# Patient Record
Sex: FEMALE | Race: WHITE | NOT HISPANIC OR LATINO | Employment: FULL TIME | URBAN - METROPOLITAN AREA
[De-identification: names, ages, dates, MRNs, and addresses within clinical notes are randomized per-mention and may not be internally consistent; named-entity substitution may affect disease eponyms.]

---

## 2017-03-16 ENCOUNTER — HOSPITAL ENCOUNTER (OUTPATIENT)
Dept: LAB | Facility: MEDICAL CENTER | Age: 37
End: 2017-03-16
Attending: OBSTETRICS & GYNECOLOGY
Payer: COMMERCIAL

## 2017-03-16 PROCEDURE — 88175 CYTOPATH C/V AUTO FLUID REDO: CPT

## 2017-03-16 PROCEDURE — 87624 HPV HI-RISK TYP POOLED RSLT: CPT

## 2017-03-17 LAB
CYTOLOGY REG CYTOL: NORMAL
HPV HR 12 DNA CVX QL NAA+PROBE: NEGATIVE
HPV16 DNA SPEC QL NAA+PROBE: NEGATIVE
HPV18 DNA SPEC QL NAA+PROBE: NEGATIVE
SPECIMEN SOURCE: NORMAL

## 2017-11-10 ENCOUNTER — OFFICE VISIT (OUTPATIENT)
Dept: URGENT CARE | Facility: PHYSICIAN GROUP | Age: 37
End: 2017-11-10
Payer: COMMERCIAL

## 2017-11-10 VITALS
OXYGEN SATURATION: 99 % | SYSTOLIC BLOOD PRESSURE: 100 MMHG | HEART RATE: 97 BPM | TEMPERATURE: 100.3 F | DIASTOLIC BLOOD PRESSURE: 70 MMHG | RESPIRATION RATE: 13 BRPM | HEIGHT: 62 IN | WEIGHT: 103 LBS | BODY MASS INDEX: 18.95 KG/M2

## 2017-11-10 DIAGNOSIS — J22 LRTI (LOWER RESPIRATORY TRACT INFECTION): ICD-10-CM

## 2017-11-10 DIAGNOSIS — J01.00 ACUTE MAXILLARY SINUSITIS, RECURRENCE NOT SPECIFIED: ICD-10-CM

## 2017-11-10 PROCEDURE — 99214 OFFICE O/P EST MOD 30 MIN: CPT | Performed by: PHYSICIAN ASSISTANT

## 2017-11-10 RX ORDER — BENZONATATE 200 MG/1
200 CAPSULE ORAL 3 TIMES DAILY PRN
Qty: 60 CAP | Refills: 0 | Status: SHIPPED | OUTPATIENT
Start: 2017-11-10 | End: 2019-11-07

## 2017-11-10 RX ORDER — CODEINE PHOSPHATE AND GUAIFENESIN 10; 100 MG/5ML; MG/5ML
5 SOLUTION ORAL EVERY 6 HOURS PRN
Qty: 120 ML | Refills: 0 | Status: SHIPPED | OUTPATIENT
Start: 2017-11-10 | End: 2019-11-07 | Stop reason: SDUPTHER

## 2017-11-10 RX ORDER — DOXYCYCLINE HYCLATE 100 MG
100 TABLET ORAL 2 TIMES DAILY
Qty: 14 TAB | Refills: 0 | Status: SHIPPED | OUTPATIENT
Start: 2017-11-10 | End: 2017-11-17

## 2017-11-10 ASSESSMENT — ENCOUNTER SYMPTOMS
ABDOMINAL PAIN: 0
DIZZINESS: 0
TINGLING: 0
WHEEZING: 0
SORE THROAT: 1
SINUS PRESSURE: 1
MYALGIAS: 0
SWOLLEN GLANDS: 0
SPUTUM PRODUCTION: 1
FOCAL WEAKNESS: 0
DIARRHEA: 0
COUGH: 1
HEADACHES: 1
VOMITING: 0
CHILLS: 0
SHORTNESS OF BREATH: 0
PALPITATIONS: 0
SENSORY CHANGE: 0
FEVER: 0
NAUSEA: 0

## 2017-11-10 NOTE — PROGRESS NOTES
Subjective:      Keith Goff is a 36 y.o. female who presents with Sinus Problem (C/o productive cough, worse at night, chest congestion, heavy chest, nasal congestion, sinus pain/pressure, post nasal drip, pos fever x1 wk.)            Sinus Problem   This is a new problem. Episode onset: 1 week  The problem is unchanged. Maximum temperature: subjective  The pain is mild. Associated symptoms include congestion, coughing (productive with purulent sputum), headaches, sinus pressure and a sore throat. Pertinent negatives include no chills, ear pain, shortness of breath or swollen glands. Treatments tried: Tylenol Cold and Flu, Theraflu, Advil, Delsym  The treatment provided no relief.       Past Medical History:   Diagnosis Date   • Allergy    • Anxiety    • GERD (gastroesophageal reflux disease)    • Headache(784.0)      History reviewed. No pertinent surgical history.  Family History   Problem Relation Age of Onset   • Cancer Mother      Breast   • Lung Disease Mother      mets from breast   • Heart Disease Father    • Hypertension Father    • Hyperlipidemia Father    • Stroke Paternal Aunt    • Stroke Paternal Uncle    • Cancer Maternal Grandfather      Throat   • Heart Disease Maternal Grandfather      Bee Sting reaction         No Known Allergies    Medications, Allergies, and current problem list reviewed today in Epic    Review of Systems   Constitutional: Negative for chills, fever and malaise/fatigue.   HENT: Positive for congestion, sinus pressure and sore throat. Negative for ear discharge and ear pain.         Sinus problem   Respiratory: Positive for cough (productive with purulent sputum) and sputum production. Negative for shortness of breath and wheezing.    Cardiovascular: Negative for chest pain, palpitations and leg swelling.   Gastrointestinal: Negative for abdominal pain, diarrhea, nausea and vomiting.   Musculoskeletal: Negative for myalgias.   Neurological: Positive for headaches. Negative  "for dizziness, tingling, sensory change and focal weakness.     All other systems reviewed and are negative.        Objective:     /70   Pulse 97   Temp 37.9 °C (100.3 °F)   Resp 13   Ht 1.575 m (5' 2\")   Wt 46.7 kg (103 lb)   SpO2 99%   BMI 18.84 kg/m²      Physical Exam   Constitutional: She is oriented to person, place, and time. She appears well-developed and well-nourished. No distress.   HENT:   Head: Normocephalic and atraumatic.   Right Ear: Tympanic membrane, external ear and ear canal normal.   Left Ear: Tympanic membrane, external ear and ear canal normal.   Nose: Mucosal edema and rhinorrhea present. Right sinus exhibits maxillary sinus tenderness and frontal sinus tenderness. Left sinus exhibits maxillary sinus tenderness and frontal sinus tenderness.   Mouth/Throat: Uvula is midline, oropharynx is clear and moist and mucous membranes are normal.   Eyes: Conjunctivae are normal.   Neck: Neck supple.   Cardiovascular: Normal rate, regular rhythm and normal heart sounds.  Exam reveals no gallop and no friction rub.    No murmur heard.  Pulmonary/Chest: Effort normal. No respiratory distress. She has no decreased breath sounds. She has wheezes (mild wheezes in upper lung fields bilaterally). She has no rhonchi. She has no rales.   Lymphadenopathy:     She has no cervical adenopathy.   Neurological: She is alert and oriented to person, place, and time. No cranial nerve deficit.   Skin: Skin is warm and dry. No rash noted.   Psychiatric: She has a normal mood and affect. Her behavior is normal. Judgment and thought content normal.               Assessment/Plan:     1. Acute maxillary sinusitis, recurrence not specified  doxycycline (VIBRAMYCIN) 100 MG Tab    guaifenesin-codeine (CHERATUSSIN AC) Solution oral solution    benzonatate (TESSALON) 200 MG capsule   2. LRTI (lower respiratory tract infection)  doxycycline (VIBRAMYCIN) 100 MG Tab    guaifenesin-codeine (CHERATUSSIN AC) Solution oral " solution       .    Current Outpatient Prescriptions:     •  doxycycline (VIBRAMYCIN) 100 MG Tab, Take 1 Tab by mouth 2 times a day for 7 days., Disp: 14 Tab, Rfl: 0  •  guaifenesin-codeine (CHERATUSSIN AC) Solution oral solution, Take 5 mL by mouth every 6 hours as needed., Disp: 120 mL, Rfl: 0  Sedation side effects discussed. No Driving or alcohol with medication given.  •  benzonatate (TESSALON) 200 MG capsule, Take 1 Cap by mouth 3 times a day as needed for Cough., Disp: 60 Cap, Rfl: 0       Differential diagnoses, Supportive care, and indications for immediate follow-up discussed with patient.   Instructed to return to clinic or nearest emergency department for any change in condition, further concerns, or worsening of symptoms.    The patient demonstrated a good understanding and agreed with the treatment plan.    Milly Munoz P.A.-C.

## 2017-12-12 ENCOUNTER — OFFICE VISIT (OUTPATIENT)
Dept: MEDICAL GROUP | Facility: PHYSICIAN GROUP | Age: 37
End: 2017-12-12
Payer: COMMERCIAL

## 2017-12-12 VITALS
DIASTOLIC BLOOD PRESSURE: 78 MMHG | BODY MASS INDEX: 19.39 KG/M2 | HEART RATE: 116 BPM | WEIGHT: 106 LBS | TEMPERATURE: 99 F | OXYGEN SATURATION: 97 % | SYSTOLIC BLOOD PRESSURE: 118 MMHG

## 2017-12-12 DIAGNOSIS — J22 LRTI (LOWER RESPIRATORY TRACT INFECTION): ICD-10-CM

## 2017-12-12 DIAGNOSIS — J01.00 ACUTE MAXILLARY SINUSITIS, RECURRENCE NOT SPECIFIED: ICD-10-CM

## 2017-12-12 PROCEDURE — 99214 OFFICE O/P EST MOD 30 MIN: CPT | Performed by: NURSE PRACTITIONER

## 2017-12-12 RX ORDER — CODEINE PHOSPHATE AND GUAIFENESIN 10; 100 MG/5ML; MG/5ML
10 SOLUTION ORAL EVERY 6 HOURS PRN
Qty: 180 ML | Refills: 0 | Status: SHIPPED
Start: 2017-12-12 | End: 2019-11-07

## 2017-12-12 RX ORDER — AMOXICILLIN AND CLAVULANATE POTASSIUM 875; 125 MG/1; MG/1
1 TABLET, FILM COATED ORAL 2 TIMES DAILY
Qty: 14 TAB | Refills: 0 | Status: SHIPPED | OUTPATIENT
Start: 2017-12-12 | End: 2017-12-19

## 2017-12-12 NOTE — PROGRESS NOTES
Chief Complaint   Patient presents with   • Sinusitis     cough, sinus and chest congestion,  x 8 days     37 y.o. female established patient presents today for evaluation and management of:   HPI: 8 days of illness including: fever, chills, nasal congestion, rhinorrhea, facial pain, cough,  Mucus is: thick, yellow  Symptoms negative for hemoptysis, vomiting, diarrhea, rash   Similarly ill exposures: yes  Medical history positive for recent sinusitis, LURI   visit last month, Treated with doxycycline which caused a significant amount of nausea but she did complete treatment. Symptoms improved for a couple of weeks and then returned.   She  reports that she has never smoked. She has never used smokeless tobacco.    ROS   No skin rashes.  No N/V/D  No Known Allergies   Current Outpatient Prescriptions   Medication Sig Dispense Refill   • Uthxsgdrn-EXF-LD-APAP (TYLENOL COLD CONVENIENCE PACK PO) Take  by mouth.     • amoxicillin-clavulanate (AUGMENTIN) 875-125 MG Tab Take 1 Tab by mouth 2 times a day for 7 days. 14 Tab 0   • guaifenesin-codeine (ROBITUSSIN AC) Solution oral solution Take 10 mL by mouth every 6 hours as needed for Cough. Drink plenty of water. Do not drink alcohol, drive, or operate machinery while taking. 180 mL 0   • benzonatate (TESSALON) 200 MG capsule Take 1 Cap by mouth 3 times a day as needed for Cough. 60 Cap 0   • fluticasone (FLONASE) 50 MCG/ACT nasal spray Spray 1 Spray in nose 2 times a day. 16 g 1   • guaifenesin-codeine (CHERATUSSIN AC) Solution oral solution Take 5 mL by mouth every 6 hours as needed. 120 mL 0     No current facility-administered medications for this visit.        Blood pressure 118/78, pulse (!) 116, temperature 37.2 °C (99 °F), weight 48.1 kg (106 lb), SpO2 97 %.  Gen: alert, No conversational dyspnea. No audible wheeze, nontoxic.  PERRL, conjunctiva slightly injected. No photophobia. No eye discharge.  Ears: normal pinnae. TM: bulging  Nares non-patent with thin  mucus.  Sinuses tender over maxillary / frontal sinuses  Throat: mild erythematous injection. No exudate.   Neck: supple, with  no adenopathy  Lungs:  clear to auscultation, no wheezing, no retraction, no stridor, good air exchange.   Abdomen soft. No HSM.   Skin: warm and dry. No rash.    A/P    1. Acute maxillary sinusitis, recurrence not specified  amoxicillin-clavulanate (AUGMENTIN) 875-125 MG Tab   2. LRTI (lower respiratory tract infection)  amoxicillin-clavulanate (AUGMENTIN) 875-125 MG Tab    guaifenesin-codeine (ROBITUSSIN AC) Solution oral solution      Differential diagnosis, natural history, treatment options, supportive care, and indications for immediate follow-up discussed at length.     Treatments advised today in addition to orders above  include: Nasal decongestant, sinus rinse or nasal saline, OTC cough/cold product of patient's choice PRN, prescription for symptomatic treatment as written and fluids and rest  Followup for worsening symptoms, difficulty breathing, lack of expected recovery, or should new symptoms or problems arise.

## 2018-03-20 ENCOUNTER — HOSPITAL ENCOUNTER (OUTPATIENT)
Dept: LAB | Facility: MEDICAL CENTER | Age: 38
End: 2018-03-20
Attending: OBSTETRICS & GYNECOLOGY
Payer: COMMERCIAL

## 2018-03-20 LAB — CYTOLOGY REG CYTOL: NORMAL

## 2018-03-20 PROCEDURE — 88175 CYTOPATH C/V AUTO FLUID REDO: CPT

## 2019-04-22 ENCOUNTER — HOSPITAL ENCOUNTER (OUTPATIENT)
Dept: LAB | Facility: MEDICAL CENTER | Age: 39
End: 2019-04-22
Attending: OBSTETRICS & GYNECOLOGY
Payer: COMMERCIAL

## 2019-04-22 PROCEDURE — 88175 CYTOPATH C/V AUTO FLUID REDO: CPT

## 2019-04-24 LAB — CYTOLOGY REG CYTOL: NORMAL

## 2019-05-15 ENCOUNTER — TELEPHONE (OUTPATIENT)
Dept: SCHEDULING | Facility: IMAGING CENTER | Age: 39
End: 2019-05-15

## 2019-05-16 ENCOUNTER — OFFICE VISIT (OUTPATIENT)
Dept: MEDICAL GROUP | Facility: PHYSICIAN GROUP | Age: 39
End: 2019-05-16
Payer: COMMERCIAL

## 2019-05-16 VITALS
HEIGHT: 62 IN | OXYGEN SATURATION: 98 % | WEIGHT: 116.2 LBS | DIASTOLIC BLOOD PRESSURE: 70 MMHG | HEART RATE: 84 BPM | BODY MASS INDEX: 21.38 KG/M2 | RESPIRATION RATE: 16 BRPM | TEMPERATURE: 99.1 F | SYSTOLIC BLOOD PRESSURE: 106 MMHG

## 2019-05-16 DIAGNOSIS — J01.90 SUBACUTE RHINOSINUSITIS: ICD-10-CM

## 2019-05-16 DIAGNOSIS — Z00.00 ANNUAL PHYSICAL EXAM: ICD-10-CM

## 2019-05-16 PROCEDURE — 99214 OFFICE O/P EST MOD 30 MIN: CPT | Performed by: NURSE PRACTITIONER

## 2019-05-16 RX ORDER — BENZONATATE 100 MG/1
100 CAPSULE ORAL 3 TIMES DAILY PRN
Qty: 60 CAP | Refills: 0 | Status: SHIPPED | OUTPATIENT
Start: 2019-05-16 | End: 2019-11-07

## 2019-05-16 RX ORDER — LORATADINE 10 MG/1
10 TABLET ORAL DAILY
COMMUNITY
End: 2022-12-29

## 2019-05-16 RX ORDER — AMOXICILLIN AND CLAVULANATE POTASSIUM 875; 125 MG/1; MG/1
1 TABLET, FILM COATED ORAL 2 TIMES DAILY
Qty: 14 TAB | Refills: 0 | Status: SHIPPED | OUTPATIENT
Start: 2019-05-16 | End: 2019-05-23

## 2019-05-16 RX ORDER — ACETAMINOPHEN 325 MG/1
650 TABLET ORAL EVERY 4 HOURS PRN
COMMUNITY
End: 2019-11-07

## 2019-05-16 ASSESSMENT — PATIENT HEALTH QUESTIONNAIRE - PHQ9: CLINICAL INTERPRETATION OF PHQ2 SCORE: 0

## 2019-05-16 NOTE — PROGRESS NOTES
Chief Complaint   Patient presents with   • Establish Care     sick x 1 week       HISTORY OF PRESENT ILLNESS: Patient is a 38 y.o. female, established patient who presents today to discuss medical problems as listed below:    Health Maintenance:  COMPLETED    Subacute rhinosinusitis  This is a new problem. Pt states she started coughing last week. She also has frontal sinus pressure, nasal discharge, post nasal drip, sore throat, headaches, mild ear pressure. She feels like her cold is settling down in her chest. She tried OTC medicines for cold which did not help. She is getting worse, and her cough now is productive. She admits to sick contacts at work and home (2 sons  age). Pertinent negatives: no chills, no CP, SOB, no wheezing, no NVD.      Patient Active Problem List    Diagnosis Date Noted   • Annual physical exam 05/16/2019   • Subacute rhinosinusitis 05/16/2019        Allergies: Doxycycline    Current Outpatient Prescriptions   Medication Sig Dispense Refill   • loratadine (CLARITIN) 10 MG Tab Take 10 mg by mouth every day.     • acetaminophen (TYLENOL) 325 MG Tab Take 650 mg by mouth every four hours as needed.     • amoxicillin-clavulanate (AUGMENTIN) 875-125 MG Tab Take 1 Tab by mouth 2 times a day for 7 days. 14 Tab 0   • benzonatate (TESSALON) 100 MG Cap Take 1 Cap by mouth 3 times a day as needed for Cough. 60 Cap 0   • Rwklcnflf-KJH-DU-APAP (TYLENOL COLD CONVENIENCE PACK PO) Take  by mouth.     • guaifenesin-codeine (ROBITUSSIN AC) Solution oral solution Take 10 mL by mouth every 6 hours as needed for Cough. Drink plenty of water. Do not drink alcohol, drive, or operate machinery while taking. (Patient not taking: Reported on 5/16/2019) 180 mL 0   • guaifenesin-codeine (CHERATUSSIN AC) Solution oral solution Take 5 mL by mouth every 6 hours as needed. (Patient not taking: Reported on 5/16/2019) 120 mL 0   • benzonatate (TESSALON) 200 MG capsule Take 1 Cap by mouth 3 times a day as needed  "for Cough. (Patient not taking: Reported on 5/16/2019) 60 Cap 0   • fluticasone (FLONASE) 50 MCG/ACT nasal spray Spray 1 Spray in nose 2 times a day. (Patient not taking: Reported on 5/16/2019) 16 g 1     No current facility-administered medications for this visit.        Social History   Substance Use Topics   • Smoking status: Never Smoker   • Smokeless tobacco: Never Used   • Alcohol use 0.5 oz/week     1 Cans of beer per week      Comment: 1/wk     Social History     Social History Narrative   • No narrative on file       Family History   Problem Relation Age of Onset   • Cancer Mother         Breast   • Lung Disease Mother         mets from breast   • Heart Disease Father    • Hypertension Father    • Hyperlipidemia Father    • Stroke Paternal Aunt    • Stroke Paternal Uncle    • Cancer Maternal Grandfather         Throat   • Heart Disease Maternal Grandfather         Bee Sting reaction       Allergies, past medical history, past surgical history, family history, social history reviewed and updated.    Review of Systems:      - Constitutional: positive for fever last night (100.4), no chills, unexpected weight change, and fatigue/generalized weakness.     - HEENT: positive for mild headaches, b/l ear pressure, rhinorrhea, sinus congestion, sore throat    - Respiratory: positive for cough, sputum production and  chest congestion, no dyspnea, wheezing, and crackles.      - Cardiovascular: Negative for chest pain, palpitations, orthopnea, and bilateral lower extremity edema.     - Psychiatric/Behavioral: Negative for depression, suicidal/homicidal ideation and memory loss.      All other systems reviewed and are negative    Exam:    /70 (BP Location: Right arm, Patient Position: Sitting, BP Cuff Size: Adult)   Pulse 84   Temp 37.3 °C (99.1 °F) (Temporal)   Resp 16   Ht 1.575 m (5' 2\")   Wt 52.7 kg (116 lb 3.2 oz)   LMP 05/11/2019 (Exact Date)   SpO2 98%   BMI 21.25 kg/m²  Body mass index is 21.25 " kg/m².    Physical Exam:  Constitutional: Well-developed and well-nourished. Not diaphoretic. No distress.   Skin: Skin is warm and dry. No rash noted.  Eyes: Conjunctivae and extraocular motions are normal. Pupils are equal, round, and reactive to light. No scleral icterus.   Ears:  External ears unremarkable. Tympanic membranes clear and intact.  Nose: Nares patent. Septum midline. Turbinates with mild erythema, no edema.   Mouth/Throat: Dentition is normal. Tongue normal. Oropharynx is clear and moist. Posterior pharynx with erythema, no exudates.  Neck: Supple, trachea midline. Normal range of motion. No thyromegaly present. No lymphadenopathy--cervical or supraclavicular.  Cardiovascular: Regular rate and rhythm, S1 and S2 without murmur, rubs, or gallops.    Chest: Effort normal. Clear to auscultation throughout. No adventitious sounds. No CVA tenderness.  Abdomen: Soft, non tender, and without distention. Active bowel sounds in all four quadrants. No rebound, guarding, masses or HSM.  : Negative for dysuria, polyuria, hematuria, pyuria, urinary urgency, and urinary incontinence.  Extremities: No cyanosis, clubbing, erythema, nor edema. Distal pulses intact and symmetric.   Musculoskeletal: All major joints AROM full in all directions without pain.  Neurological: Alert and oriented x 3. DTRs 2+/3 and symmetric.   Psychiatric:  Behavior, mood, and affect are appropriate.    Assessment/Plan:  1. Annual physical exam  - CBC WITH DIFFERENTIAL; Future  - Comp Metabolic Panel; Future  - FREE THYROXINE; Future  - HEMOGLOBIN A1C; Future  - Lipid Profile; Future  - TSH; Future  - VITAMIN D,25 HYDROXY; Future    2. Subacute rhinosinusitis:  Uncontrolled. Discussed prescribed regimen and rational. Educated on supportive care, rest, hydration with warm fluids. OTC vit C, Zinc and oregano oil supplements. Avoid refined carbohydrates while on antibiotics, add more fruits and veggies to diet.  - amoxicillin-clavulanate  (AUGMENTIN) 875-125 MG Tab; Take 1 Tab by mouth 2 times a day for 7 days.  Dispense: 14 Tab; Refill: 0  - benzonatate (TESSALON) 100 MG Cap; Take 1 Cap by mouth 3 times a day as needed for Cough.  Dispense: 60 Cap; Refill: 0    Discussed with patient possible alternative diagnoses, pt is to take all medications as prescribed. If symptoms persist FU w/PCP, if symptoms worsen go to emergency room.  Reviewed indication, dosage, usage and potential adverse effects of prescribed medications. Reviewed risks and benefits of treatment plan. Patient verbalizes understanding of all instruction and verbally agrees to plan.    Return if symptoms worsen or fail to improve.

## 2019-05-16 NOTE — ASSESSMENT & PLAN NOTE
This is a new problem. Pt states she started coughing last week. She also has frontal sinus pressure, nasal discharge, post nasal drip, sore throat, headaches, mild ear pressure. She feels like her cold is settling down in her chest. She tried OTC medicines for cold which did not help. She is getting worse, and her cough now is productive. She admits to sick contacts at work and home (2 sons  age). Pertinent negatives: no chills, no CP, SOB, no wheezing, no NVD.

## 2019-11-07 ENCOUNTER — OFFICE VISIT (OUTPATIENT)
Dept: MEDICAL GROUP | Facility: PHYSICIAN GROUP | Age: 39
End: 2019-11-07
Payer: COMMERCIAL

## 2019-11-07 VITALS
TEMPERATURE: 98.3 F | DIASTOLIC BLOOD PRESSURE: 68 MMHG | HEART RATE: 96 BPM | WEIGHT: 115.8 LBS | HEIGHT: 62 IN | SYSTOLIC BLOOD PRESSURE: 120 MMHG | BODY MASS INDEX: 21.31 KG/M2 | OXYGEN SATURATION: 96 %

## 2019-11-07 DIAGNOSIS — J22 LRTI (LOWER RESPIRATORY TRACT INFECTION): ICD-10-CM

## 2019-11-07 DIAGNOSIS — J01.00 ACUTE MAXILLARY SINUSITIS, RECURRENCE NOT SPECIFIED: ICD-10-CM

## 2019-11-07 DIAGNOSIS — J32.9 RHINOSINUSITIS: ICD-10-CM

## 2019-11-07 PROCEDURE — 99213 OFFICE O/P EST LOW 20 MIN: CPT | Performed by: NURSE PRACTITIONER

## 2019-11-07 RX ORDER — FLUTICASONE PROPIONATE 50 MCG
1 SPRAY, SUSPENSION (ML) NASAL
Qty: 1 BOTTLE | Refills: 1 | Status: SHIPPED | OUTPATIENT
Start: 2019-11-07 | End: 2024-03-19

## 2019-11-07 RX ORDER — AMOXICILLIN AND CLAVULANATE POTASSIUM 875; 125 MG/1; MG/1
1 TABLET, FILM COATED ORAL 2 TIMES DAILY
Qty: 14 TAB | Refills: 0 | Status: SHIPPED | OUTPATIENT
Start: 2019-11-07 | End: 2019-11-14

## 2019-11-07 RX ORDER — BENZONATATE 100 MG/1
100 CAPSULE ORAL 3 TIMES DAILY PRN
Qty: 60 CAP | Refills: 0 | Status: SHIPPED | OUTPATIENT
Start: 2019-11-07 | End: 2022-05-05

## 2019-11-07 RX ORDER — CODEINE PHOSPHATE AND GUAIFENESIN 10; 100 MG/5ML; MG/5ML
5 SOLUTION ORAL EVERY 6 HOURS PRN
Qty: 120 ML | Refills: 0 | Status: SHIPPED | OUTPATIENT
Start: 2019-11-07 | End: 2019-11-17

## 2019-11-07 NOTE — PROGRESS NOTES
Chief Complaint   Patient presents with   • Cough     cough and sore throat x2 weeks but getting better       HISTORY OF PRESENT ILLNESS: Patient is a 38 y.o. female, established patient who presents today to discuss medical problems as listed below:    Health Maintenance:  COMPLETED.    Rhinosinusitis  New problem.  2 weeks ago patient started having sore throat followed by cough which became productive in a few days.  Sore throat resolved.  Today patient is complaining of runny nose with colored discharge, PND, productive cough with colored sputum.  She denies sinus congestion, ear pain, headaches, dizziness, NVD, fevers (low-grade fevers in the first week while being sick).      Patient Active Problem List    Diagnosis Date Noted   • Rhinosinusitis 11/07/2019   • Annual physical exam 05/16/2019   • Subacute rhinosinusitis 05/16/2019        Allergies: Doxycycline    Current Outpatient Medications   Medication Sig Dispense Refill   • benzonatate (TESSALON) 100 MG Cap Take 1 Cap by mouth 3 times a day as needed for Cough. 60 Cap 0   • amoxicillin-clavulanate (AUGMENTIN) 875-125 MG Tab Take 1 Tab by mouth 2 times a day for 7 days. 14 Tab 0   • fluticasone (FLONASE) 50 MCG/ACT nasal spray Spray 1 Spray in nose every bedtime. 1 Bottle 1   • guaifenesin-codeine (CHERATUSSIN AC) Solution oral solution Take 5 mL by mouth every 6 hours as needed for up to 10 days. 120 mL 0   • loratadine (CLARITIN) 10 MG Tab Take 10 mg by mouth every day.     • acetaminophen (TYLENOL) 325 MG Tab Take 650 mg by mouth every four hours as needed.     • benzonatate (TESSALON) 100 MG Cap Take 1 Cap by mouth 3 times a day as needed for Cough. (Patient not taking: Reported on 11/7/2019) 60 Cap 0   • Qywzmolux-APO-MS-APAP (TYLENOL COLD CONVENIENCE PACK PO) Take  by mouth.     • guaifenesin-codeine (ROBITUSSIN AC) Solution oral solution Take 10 mL by mouth every 6 hours as needed for Cough. Drink plenty of water. Do not drink alcohol, drive, or  operate machinery while taking. (Patient not taking: Reported on 5/16/2019) 180 mL 0   • benzonatate (TESSALON) 200 MG capsule Take 1 Cap by mouth 3 times a day as needed for Cough. (Patient not taking: Reported on 5/16/2019) 60 Cap 0   • fluticasone (FLONASE) 50 MCG/ACT nasal spray Spray 1 Spray in nose 2 times a day. (Patient not taking: Reported on 5/16/2019) 16 g 1     No current facility-administered medications for this visit.        Social History     Tobacco Use   • Smoking status: Never Smoker   • Smokeless tobacco: Never Used   Substance Use Topics   • Alcohol use: Yes     Alcohol/week: 0.5 oz     Types: 1 Cans of beer per week     Comment: 1/wk   • Drug use: No     Social History     Patient does not qualify to have social determinant information on file (likely too young).   Social History Narrative   • Not on file       Family History   Problem Relation Age of Onset   • Cancer Mother         Breast   • Lung Disease Mother         mets from breast   • Heart Disease Father    • Hypertension Father    • Hyperlipidemia Father    • Stroke Paternal Aunt    • Stroke Paternal Uncle    • Cancer Maternal Grandfather         Throat   • Heart Disease Maternal Grandfather         Bee Sting reaction       Allergies, past medical history, past surgical history, family history, social history reviewed and updated.    Review of Systems:      - Constitutional: Negative for fever, chills, unexpected weight change, and fatigue/generalized weakness.     - HEENT: Positive for rhinorrhea, mild sore throat and PND.  Negative for headaches, vision changes, hearing changes, sinus congestion, and neck pain.      - Respiratory: Positive for productive cough with colored sputum, no chest congestion, dyspnea, wheezing, and crackles.      - Cardiovascular: Negative for chest pain, palpitations, orthopnea, and bilateral lower extremity edema.       All other systems reviewed and are negative    Exam:    /68 (BP Location: Left  "arm, Patient Position: Sitting, BP Cuff Size: Adult)   Pulse 96   Temp 36.8 °C (98.3 °F) (Temporal)   Ht 1.575 m (5' 2\")   Wt 52.5 kg (115 lb 12.8 oz)   SpO2 96%   BMI 21.18 kg/m²  Body mass index is 21.18 kg/m².    Physical Exam:  Constitutional: Well-developed and well-nourished. Not diaphoretic. No distress.   Ears: Tympanic membranes clear and intact.  Nose: Turbinates with erythema and mild edema. No discharge.   Mouth/Throat:  Posterior pharynx with mild erythema, no exudates.  Neck: No lymphadenopathy--cervical or supraclavicular.  Cardiovascular: Regular rate and rhythm, S1 and S2 without murmur, rubs, or gallops.    Chest: Effort normal. Clear to auscultation throughout. No adventitious sounds.     Assessment/Plan:  1. Rhinosinusitis  Take antibiotics as prescribed to avoid antibiotic resistance.  Discussed side effects of codeine and a cough syrup.  Avoid taking during the day and before driving, use Tessalon Perls during the day.  Discussed importance of supportive care such as increased hydration and rest, warm fluids preferred.  OTC vitamin C and zinc.  - guaifenesin-codeine (CHERATUSSIN AC) Solution oral solution; Take 5 mL by mouth every 6 hours as needed for up to 10 days.  Dispense: 120 mL; Refill: 0      Discussed with patient possible alternative diagnoses, pt is to take all medications as prescribed. If symptoms persist FU w/PCP, if symptoms worsen go to emergency room. If experiencing any side effects from prescribed medications reports to the office immediately or go to emergency room.  Reviewed indication, dosage, usage and potential adverse effects of prescribed medications. Reviewed risks and benefits of treatment plan. Patient verbalizes understanding of all instruction and verbally agrees to plan.    Return if symptoms worsen or fail to improve.  "

## 2019-11-07 NOTE — ASSESSMENT & PLAN NOTE
New problem.  2 weeks ago patient started having sore throat followed by cough which became productive in a few days.  Sore throat resolved.  Today patient is complaining of runny nose with colored discharge, PND, productive cough with colored sputum.  She denies sinus congestion, ear pain, headaches, dizziness, NVD, fevers (low-grade fevers in the first week while being sick).

## 2021-10-22 ENCOUNTER — HOSPITAL ENCOUNTER (OUTPATIENT)
Dept: LAB | Facility: MEDICAL CENTER | Age: 41
End: 2021-10-22
Attending: PHYSICIAN ASSISTANT
Payer: COMMERCIAL

## 2021-10-22 PROCEDURE — 88175 CYTOPATH C/V AUTO FLUID REDO: CPT

## 2021-10-23 LAB — CYTOLOGY REG CYTOL: NORMAL

## 2022-05-05 ENCOUNTER — OFFICE VISIT (OUTPATIENT)
Dept: MEDICAL GROUP | Facility: PHYSICIAN GROUP | Age: 42
End: 2022-05-05
Payer: COMMERCIAL

## 2022-05-05 VITALS
TEMPERATURE: 97.6 F | HEART RATE: 103 BPM | HEIGHT: 62 IN | BODY MASS INDEX: 21.49 KG/M2 | RESPIRATION RATE: 16 BRPM | SYSTOLIC BLOOD PRESSURE: 110 MMHG | OXYGEN SATURATION: 97 % | DIASTOLIC BLOOD PRESSURE: 70 MMHG | WEIGHT: 116.8 LBS

## 2022-05-05 DIAGNOSIS — R09.89 CHEST CONGESTION: ICD-10-CM

## 2022-05-05 DIAGNOSIS — R06.2 WHEEZING: ICD-10-CM

## 2022-05-05 DIAGNOSIS — Z12.31 ENCOUNTER FOR SCREENING MAMMOGRAM FOR BREAST CANCER: ICD-10-CM

## 2022-05-05 DIAGNOSIS — R68.89 FLU-LIKE SYMPTOMS: ICD-10-CM

## 2022-05-05 DIAGNOSIS — R05.9 COUGH: ICD-10-CM

## 2022-05-05 PROCEDURE — 99214 OFFICE O/P EST MOD 30 MIN: CPT | Performed by: NURSE PRACTITIONER

## 2022-05-05 RX ORDER — GUAIFENESIN 600 MG/1
600 TABLET, EXTENDED RELEASE ORAL EVERY 12 HOURS
Qty: 60 TABLET | Refills: 1 | Status: SHIPPED | OUTPATIENT
Start: 2022-05-05 | End: 2022-05-15

## 2022-05-05 RX ORDER — TRIAMCINOLONE ACETONIDE 55 UG/1
2 SPRAY, METERED NASAL DAILY
Qty: 16.5 G | Refills: 1 | Status: SHIPPED
Start: 2022-05-05 | End: 2022-12-29

## 2022-05-05 RX ORDER — BENZONATATE 100 MG/1
100 CAPSULE ORAL 3 TIMES DAILY PRN
Qty: 60 CAPSULE | Refills: 0 | Status: SHIPPED | OUTPATIENT
Start: 2022-05-05

## 2022-05-05 RX ORDER — ALBUTEROL SULFATE 90 UG/1
2 AEROSOL, METERED RESPIRATORY (INHALATION) EVERY 6 HOURS PRN
Qty: 8.5 G | Refills: 1 | Status: SHIPPED | OUTPATIENT
Start: 2022-05-05

## 2022-05-05 RX ORDER — AZITHROMYCIN 250 MG/1
TABLET, FILM COATED ORAL
Qty: 6 TABLET | Refills: 0 | Status: SHIPPED
Start: 2022-05-05 | End: 2022-12-29

## 2022-05-05 ASSESSMENT — PATIENT HEALTH QUESTIONNAIRE - PHQ9: CLINICAL INTERPRETATION OF PHQ2 SCORE: 0

## 2022-05-05 NOTE — PROGRESS NOTES
Chief Complaint   Patient presents with   • Follow-Up     Flu last week, congestion and cough       HISTORY OF PRESENT ILLNESS: Patient is a 41 y.o. female, established patient who presents today to discuss medical problems as listed below:    Health Maintenance:  COMPLETED     Flu-like symptoms  New onset of flulike symptoms.  Cough, sinus congestion, chest congestion.  Associated symptoms are nasal drainage, PND.  Mild dyspnea, no CP, no wheezing, no fever.  Sick contacts and kids, diagnosed with influenza A.  Denies GI symptoms, no nausea, diarrhea.  Patient is not interested in being tested for COVID and influenza today.  Vital signs within normal limits except slightly elevated pulse at 103, satting at 97% on room air.      Patient Active Problem List    Diagnosis Date Noted   • Cough 05/05/2022   • Flu-like symptoms 05/05/2022   • Rhinosinusitis 11/07/2019   • Annual physical exam 05/16/2019   • Subacute rhinosinusitis 05/16/2019        Allergies: Doxycycline    Current Outpatient Medications   Medication Sig Dispense Refill   • guaiFENesin ER (MUCINEX) 600 MG TABLET SR 12 HR Take 1 Tablet by mouth every 12 hours for 10 days. 60 Tablet 1   • benzonatate (TESSALON) 100 MG Cap Take 1 Capsule by mouth 3 times a day as needed for Cough. 60 Capsule 0   • azithromycin (ZITHROMAX) 250 MG Tab Take 2 tabs on day 1, then take 1 tab on days 2-5 6 Tablet 0   • triamcinolone (NASACORT) 55 MCG/ACT nasal inhaler Administer 2 Sprays into affected nostril(S) every day. 16.5 g 1   • albuterol 108 (90 Base) MCG/ACT Aero Soln inhalation aerosol Inhale 2 Puffs every 6 hours as needed for Shortness of Breath. 8.5 g 1   • fluticasone (FLONASE) 50 MCG/ACT nasal spray Spray 1 Spray in nose every bedtime. 1 Bottle 1   • loratadine (CLARITIN) 10 MG Tab Take 10 mg by mouth every day.       No current facility-administered medications for this visit.       Social History     Tobacco Use   • Smoking status: Never Smoker   • Smokeless  "tobacco: Never Used   Substance Use Topics   • Alcohol use: Yes     Alcohol/week: 0.5 oz     Types: 1 Cans of beer per week     Comment: 1/wk   • Drug use: No     Social History     Social History Narrative   • Not on file       Family History   Problem Relation Age of Onset   • Cancer Mother         Breast   • Lung Disease Mother         mets from breast   • Heart Disease Father    • Hypertension Father    • Hyperlipidemia Father    • Stroke Paternal Aunt    • Stroke Paternal Uncle    • Cancer Maternal Grandfather         Throat   • Heart Disease Maternal Grandfather         Bee Sting reaction       Allergies, past medical history, past surgical history, family history, social history reviewed and updated.    Review of Systems:     - Constitutional: Negative for fever, chills, unexpected weight change, and fatigue/generalized weakness.     - HEENT: Rhinorrhea, sinus congestion, chest congestion, rhinitis, cough.       - Respiratory: Cough, chest congestion, dyspnea, mild wheezing.      - Cardiovascular: Negative for chest pain, palpitations, orthopnea, and bilateral lower extremity edema.     - Psychiatric/Behavioral: Negative for depression, suicidal/homicidal ideation and memory loss.      All other systems reviewed and are negative    Exam:    /70   Pulse (!) 103   Temp 36.4 °C (97.6 °F) (Temporal)   Resp 16   Ht 1.575 m (5' 2\")   Wt 53 kg (116 lb 12.8 oz)   SpO2 97%   BMI 21.36 kg/m²  Body mass index is 21.36 kg/m².    Physical Exam:  Constitutional: Well-developed and well-nourished. Not diaphoretic. No distress.   Ears:  External ears unremarkable. Tympanic membranes clear and intact.  Nose:  Turbinates without erythema nor edema. No discharge.   Mouth/Throat:  Posterior pharynx with moderate erythema no exudates.  Neck:  No lymphadenopathy--cervical or supraclavicular.  Cardiovascular: Regular rate and rhythm, S1 and S2 without murmur, rubs, or gallops.    Chest: Noted congested cough with deep " breaths. effort normal. Clear to auscultation throughout. No adventitious sounds.   Neurological: Alert and oriented x 3.   Psychiatric:  Behavior, mood, and affect are appropriate.  MA/nursing note and vitals reviewed.     Assessment/Plan:  1. Cough  Persistent cough, not improving.  Concerns for risk of acute bacterial bronchitis.  Therapy as prescribed below.  Avoid dairy, warm fluids, vitamin C and zinc.  Supportive care.  We will follow-up 7 to 10 days..  - benzonatate (TESSALON) 100 MG Cap; Take 1 Capsule by mouth 3 times a day as needed for Cough.  Dispense: 60 Capsule; Refill: 0    2. Flu-like symptoms    3. Encounter for screening mammogram for breast cancer  - MA-SCREENING MAMMO BILAT W/TOMOSYNTHESIS W/CAD; Future    4. Chest congestion  - guaiFENesin ER (MUCINEX) 600 MG TABLET SR 12 HR; Take 1 Tablet by mouth every 12 hours for 10 days.  Dispense: 60 Tablet; Refill: 1  - azithromycin (ZITHROMAX) 250 MG Tab; Take 2 tabs on day 1, then take 1 tab on days 2-5  Dispense: 6 Tablet; Refill: 0    5. Wheezing  - triamcinolone (NASACORT) 55 MCG/ACT nasal inhaler; Administer 2 Sprays into affected nostril(S) every day.  Dispense: 16.5 g; Refill: 1  - albuterol 108 (90 Base) MCG/ACT Aero Soln inhalation aerosol; Inhale 2 Puffs every 6 hours as needed for Shortness of Breath.  Dispense: 8.5 g; Refill: 1       Discussed with patient possible alternative diagnoses, pt is to take all medications as prescribed. If symptoms persist FU w/PCP, if symptoms worsen go to emergency room. If experiencing any side effects from prescribed medications reports to the office immediately or go to emergency room.  Reviewed indication, dosage, usage and potential adverse effects of prescribed medications. Reviewed risks and benefits of treatment plan. Patient verbalizes understanding of all instruction and verbally agrees to plan.    No follow-ups on file. 7-10 days PRN

## 2022-05-05 NOTE — ASSESSMENT & PLAN NOTE
New onset of flulike symptoms.  Cough, sinus congestion, chest congestion.  Associated symptoms are nasal drainage, PND.  Mild dyspnea, no CP, no wheezing, no fever.  Sick contacts and kids, diagnosed with influenza A.  Denies GI symptoms, no nausea, diarrhea.  Patient is not interested in being tested for COVID and influenza today.  Vital signs within normal limits except slightly elevated pulse at 103, satting at 97% on room air.

## 2022-12-29 ENCOUNTER — OFFICE VISIT (OUTPATIENT)
Dept: MEDICAL GROUP | Facility: MEDICAL CENTER | Age: 42
End: 2022-12-29
Payer: COMMERCIAL

## 2022-12-29 VITALS
RESPIRATION RATE: 16 BRPM | HEIGHT: 62 IN | HEART RATE: 108 BPM | DIASTOLIC BLOOD PRESSURE: 66 MMHG | WEIGHT: 114.64 LBS | SYSTOLIC BLOOD PRESSURE: 108 MMHG | TEMPERATURE: 98.5 F | BODY MASS INDEX: 21.1 KG/M2 | OXYGEN SATURATION: 98 %

## 2022-12-29 DIAGNOSIS — R05.1 ACUTE COUGH: ICD-10-CM

## 2022-12-29 DIAGNOSIS — R05.2 SUBACUTE COUGH: ICD-10-CM

## 2022-12-29 DIAGNOSIS — J32.9 RHINOSINUSITIS: ICD-10-CM

## 2022-12-29 DIAGNOSIS — R09.81 SINUS CONGESTION: ICD-10-CM

## 2022-12-29 DIAGNOSIS — R09.89 CHEST CONGESTION: ICD-10-CM

## 2022-12-29 LAB
EXTERNAL QUALITY CONTROL: NORMAL
FLUAV+FLUBV AG SPEC QL IA: NEGATIVE
INT CON NEG: NEGATIVE
INT CON NEG: NEGATIVE
INT CON POS: POSITIVE
INT CON POS: POSITIVE
SARS-COV+SARS-COV-2 AG RESP QL IA.RAPID: NEGATIVE

## 2022-12-29 PROCEDURE — 99214 OFFICE O/P EST MOD 30 MIN: CPT | Performed by: NURSE PRACTITIONER

## 2022-12-29 PROCEDURE — 87804 INFLUENZA ASSAY W/OPTIC: CPT | Performed by: NURSE PRACTITIONER

## 2022-12-29 PROCEDURE — 87426 SARSCOV CORONAVIRUS AG IA: CPT | Performed by: NURSE PRACTITIONER

## 2022-12-29 RX ORDER — AZITHROMYCIN 250 MG/1
250 TABLET, FILM COATED ORAL
Qty: 14 TABLET | Refills: 0 | Status: SHIPPED | OUTPATIENT
Start: 2022-12-29 | End: 2023-01-05

## 2022-12-29 RX ORDER — BENZONATATE 100 MG/1
100 CAPSULE ORAL 3 TIMES DAILY PRN
Qty: 60 CAPSULE | Refills: 0 | Status: SHIPPED | OUTPATIENT
Start: 2022-12-29

## 2022-12-29 RX ORDER — TRIAMCINOLONE ACETONIDE 55 UG/1
2 SPRAY, METERED NASAL DAILY
Qty: 16.9 ML | Refills: 1 | Status: SHIPPED | OUTPATIENT
Start: 2022-12-29

## 2022-12-29 RX ORDER — AZITHROMYCIN 250 MG/1
TABLET, FILM COATED ORAL
Qty: 6 TABLET | Refills: 0 | Status: SHIPPED | OUTPATIENT
Start: 2022-12-29 | End: 2024-03-19

## 2022-12-29 RX ORDER — GUAIFENESIN 600 MG/1
600 TABLET, EXTENDED RELEASE ORAL EVERY 12 HOURS
Qty: 60 TABLET | Refills: 1 | Status: SHIPPED | OUTPATIENT
Start: 2022-12-29 | End: 2023-01-08

## 2022-12-29 RX ORDER — DEXTROMETHORPHAN HYDROBROMIDE AND GUAIFENESIN 10; 100 MG/5ML; MG/5ML
10 SOLUTION ORAL EVERY 4 HOURS PRN
Qty: 236 ML | Refills: 0 | Status: SHIPPED | OUTPATIENT
Start: 2022-12-29

## 2022-12-29 RX ORDER — BENZONATATE 100 MG/1
100 CAPSULE ORAL 3 TIMES DAILY PRN
Qty: 60 CAPSULE | Refills: 0 | Status: CANCELLED | OUTPATIENT
Start: 2022-12-29

## 2022-12-29 NOTE — ASSESSMENT & PLAN NOTE
New problem. Cough x 1 wks, a few days ago getting worse. Associated s/s, sinus congestion, rhinitis, PND. No fever, no ear pain. Sick contacts in family members.

## 2022-12-29 NOTE — PROGRESS NOTES
Chief Complaint   Patient presents with    Cough     Had flu in November, cough lingering and getting worse        HISTORY OF PRESENT ILLNESS: Patient is a 42 y.o. female, established patient who presents today to discuss medical problems as listed below:    Cough  New problem. Cough x 1 wks, a few days ago getting worse. Associated s/s, sinus congestion, rhinitis, PND. No fever, no ear pain. Sick contacts in family members.     Patient Active Problem List    Diagnosis Date Noted    Cough 05/05/2022    Flu-like symptoms 05/05/2022    Rhinosinusitis 11/07/2019    Annual physical exam 05/16/2019    Subacute rhinosinusitis 05/16/2019        Allergies: Doxycycline    Current Outpatient Medications   Medication Sig Dispense Refill    azithromycin (ZITHROMAX) 250 MG Tab Take 2 tabs on day 1, then take 1 tab on days 2-5 6 Tablet 0    guaiFENesin ER (MUCINEX) 600 MG TABLET SR 12 HR Take 1 Tablet by mouth every 12 hours for 10 days. 60 Tablet 1    triamcinolone (NASACORT) 55 MCG/ACT nasal inhaler Administer 2 Sprays into affected nostril(S) every day. 16.9 mL 1    guaifenesin dextromethorphan sugar free (ROBITUSSIN COLD COUGH+ CHEST)  MG/5ML Liquid soln Take 10 mL by mouth every four hours as needed for Cough. 236 mL 0    benzonatate (TESSALON) 100 MG Cap Take 1 Capsule by mouth 3 times a day as needed for Cough. 60 Capsule 0    albuterol 108 (90 Base) MCG/ACT Aero Soln inhalation aerosol Inhale 2 Puffs every 6 hours as needed for Shortness of Breath. 8.5 g 1    fluticasone (FLONASE) 50 MCG/ACT nasal spray Spray 1 Spray in nose every bedtime. 1 Bottle 1     No current facility-administered medications for this visit.       Social History     Tobacco Use    Smoking status: Never    Smokeless tobacco: Never   Substance Use Topics    Alcohol use: Yes     Alcohol/week: 0.5 oz     Types: 1 Cans of beer per week     Comment: 1/wk    Drug use: No     Social History     Social History Narrative    Not on file       Family  "History   Problem Relation Age of Onset    Cancer Mother         Breast    Lung Disease Mother         mets from breast    Heart Disease Father     Hypertension Father     Hyperlipidemia Father     Stroke Paternal Aunt     Stroke Paternal Uncle     Cancer Maternal Grandfather         Throat    Heart Disease Maternal Grandfather         Bee Sting reaction       Allergies, past medical history, past surgical history, family history, social history reviewed and updated.    Review of Systems:     - Constitutional: Negative for fever, chills, unexpected weight change, and fatigue/generalized weakness.     - HEENT: rhinorrhea, sinus congestion, b/ l ear tenderness. Negative for headaches, vision changes, hearing changes, ear pain, ear discharge, , sore throat, and neck pain.      - Respiratory: cough, chest congestion. Negative for sputum production, , dyspnea, wheezing, and crackles.      - Cardiovascular: Negative for chest pain, palpitations, orthopnea, and bilateral lower extremity edema.      - Psychiatric/Behavioral: Negative for depression, suicidal/homicidal ideation and memory loss.      All other systems reviewed and are negative    Exam:    /66 (BP Location: Left arm, Patient Position: Sitting, BP Cuff Size: Adult)   Pulse (!) 108   Temp 36.9 °C (98.5 °F) (Temporal)   Resp 16   Ht 1.575 m (5' 2\")   Wt 52 kg (114 lb 10.2 oz)   SpO2 98%   BMI 20.97 kg/m²  Body mass index is 20.97 kg/m².    Physical Exam:  Constitutional: Well-developed and well-nourished. Not diaphoretic. No distress.   Ears:  External ears unremarkable. Tympanic membranes clear and intact.  Nose: Nares patent. Septum midline. Turbinates without erythema nor edema. No discharge.   Mouth/Throat:  Posterior pharynx with moderate erythema no exudates.  Neck: No lymphadenopathy--cervical or supraclavicular.  Cardiovascular: Regular rate and rhythm, S1 and S2 without murmur, rubs, or gallops.    Chest: Effort normal. Clear to auscultation " throughout. No adventitious sounds.   Neurological: Alert and oriented x 3.   Psychiatric:  Behavior, mood, and affect are appropriate.  MA/nursing note and vitals reviewed.    Assessment/Plan:  1. Subacute cough  Rx, supportive care, warm fluids and rest.  - POCT Influenza A/B  - POCT SARS-COV Antigen [facility] (Symptomatic only)  - guaifenesin dextromethorphan sugar free (ROBITUSSIN COLD COUGH+ CHEST)  MG/5ML Liquid soln; Take 10 mL by mouth every four hours as needed for Cough.  Dispense: 236 mL; Refill: 0    2. Rhinosinusitis  - azithromycin (ZITHROMAX) 250 MG Tab; Take 2 tabs on day 1, then take 1 tab on days 2-5  Dispense: 6 Tablet; Refill: 0    3. Sinus congestion  - guaiFENesin ER (MUCINEX) 600 MG TABLET SR 12 HR; Take 1 Tablet by mouth every 12 hours for 10 days.  Dispense: 60 Tablet; Refill: 1  - triamcinolone (NASACORT) 55 MCG/ACT nasal inhaler; Administer 2 Sprays into affected nostril(S) every day.  Dispense: 16.9 mL; Refill: 1    4. Chest congestion  - guaiFENesin ER (MUCINEX) 600 MG TABLET SR 12 HR; Take 1 Tablet by mouth every 12 hours for 10 days.  Dispense: 60 Tablet; Refill: 1       Discussed with patient possible alternative diagnoses, patient is to take all medications as prescribed.      If symptoms persist FU w/PCP, if symptoms worsen go to emergency room.      If experiencing any side effects from prescribed medications report to the office immediately or go to emergency room.     Reviewed indication, dosage, usage and potential adverse effects of prescribed medications.      Reviewed risks and benefits of treatment plan. Patient verbalizes understanding of all instruction and verbally agrees to plan.     Discussed plan with the patient, and patient agrees to the above.      I personally reviewed prior external notes and test results pertinent to today's visit.    No follow-ups on file.

## 2022-12-30 RX ORDER — AZITHROMYCIN 250 MG/1
TABLET, FILM COATED ORAL
Qty: 6 TABLET | Refills: 0 | Status: SHIPPED | OUTPATIENT
Start: 2022-12-30

## 2022-12-31 ENCOUNTER — TELEPHONE (OUTPATIENT)
Dept: MEDICAL GROUP | Facility: MEDICAL CENTER | Age: 42
End: 2022-12-31
Payer: COMMERCIAL

## 2022-12-31 NOTE — TELEPHONE ENCOUNTER
On call provider    Received page from patient's pharmacy for order clarification. Patient was prescribed azithromycin 250 mg BID for 7 days, prescription changed to zpack yesterday. Approved changed to zpack.

## 2023-02-01 ENCOUNTER — HOSPITAL ENCOUNTER (OUTPATIENT)
Facility: MEDICAL CENTER | Age: 43
End: 2023-02-01
Attending: PHYSICIAN ASSISTANT
Payer: COMMERCIAL

## 2023-02-01 ENCOUNTER — HOSPITAL ENCOUNTER (OUTPATIENT)
Dept: LAB | Facility: MEDICAL CENTER | Age: 43
End: 2023-02-01
Attending: PHYSICIAN ASSISTANT

## 2023-02-01 PROCEDURE — 88175 CYTOPATH C/V AUTO FLUID REDO: CPT

## 2023-02-02 LAB — CYTOLOGY REG CYTOL: NORMAL

## 2024-03-19 ENCOUNTER — OFFICE VISIT (OUTPATIENT)
Dept: MEDICAL GROUP | Facility: MEDICAL CENTER | Age: 44
End: 2024-03-19
Payer: COMMERCIAL

## 2024-03-19 VITALS
OXYGEN SATURATION: 96 % | HEART RATE: 87 BPM | RESPIRATION RATE: 12 BRPM | DIASTOLIC BLOOD PRESSURE: 68 MMHG | HEIGHT: 62 IN | WEIGHT: 122.4 LBS | TEMPERATURE: 100.2 F | SYSTOLIC BLOOD PRESSURE: 100 MMHG | BODY MASS INDEX: 22.52 KG/M2

## 2024-03-19 DIAGNOSIS — R09.81 NASAL CONGESTION: ICD-10-CM

## 2024-03-19 DIAGNOSIS — J02.9 SORE THROAT: ICD-10-CM

## 2024-03-19 DIAGNOSIS — R05.9 COUGH, UNSPECIFIED TYPE: ICD-10-CM

## 2024-03-19 DIAGNOSIS — R09.89 CHEST CONGESTION: ICD-10-CM

## 2024-03-19 DIAGNOSIS — R50.9 LOW GRADE FEVER: ICD-10-CM

## 2024-03-19 DIAGNOSIS — J40 BRONCHITIS: ICD-10-CM

## 2024-03-19 PROCEDURE — 3078F DIAST BP <80 MM HG: CPT | Performed by: NURSE PRACTITIONER

## 2024-03-19 PROCEDURE — 3074F SYST BP LT 130 MM HG: CPT | Performed by: NURSE PRACTITIONER

## 2024-03-19 PROCEDURE — 99213 OFFICE O/P EST LOW 20 MIN: CPT | Performed by: NURSE PRACTITIONER

## 2024-03-19 RX ORDER — GUAIFENESIN 1200 MG/1
1200 TABLET, EXTENDED RELEASE ORAL
Qty: 28 TABLET | Refills: 0 | Status: SHIPPED
Start: 2024-03-19 | End: 2024-03-19

## 2024-03-19 RX ORDER — CODEINE PHOSPHATE AND GUAIFENESIN 10; 100 MG/5ML; MG/5ML
5 SOLUTION ORAL EVERY 6 HOURS PRN
Qty: 100 ML | Refills: 0 | Status: SHIPPED | OUTPATIENT
Start: 2024-03-19 | End: 2024-03-24

## 2024-03-19 RX ORDER — AZITHROMYCIN 250 MG/1
TABLET, FILM COATED ORAL
Qty: 6 TABLET | Refills: 0 | Status: SHIPPED | OUTPATIENT
Start: 2024-03-19

## 2024-03-19 RX ORDER — PSEUDOEPHEDRINE HCL 120 MG/1
120 TABLET, FILM COATED, EXTENDED RELEASE ORAL 2 TIMES DAILY PRN
Qty: 60 TABLET | Refills: 6 | Status: CANCELLED | OUTPATIENT
Start: 2024-03-19

## 2024-03-19 RX ORDER — FLUTICASONE PROPIONATE 50 MCG
1 SPRAY, SUSPENSION (ML) NASAL
Qty: 16 G | Refills: 1 | Status: SHIPPED | OUTPATIENT
Start: 2024-03-19

## 2024-03-19 RX ORDER — METHYLPREDNISOLONE 4 MG/1
TABLET ORAL
Qty: 21 TABLET | Refills: 0 | Status: SHIPPED | OUTPATIENT
Start: 2024-03-19

## 2024-03-19 RX ORDER — BENZONATATE 100 MG/1
100 CAPSULE ORAL 3 TIMES DAILY PRN
Qty: 60 CAPSULE | Refills: 0 | Status: CANCELLED | OUTPATIENT
Start: 2024-03-19

## 2024-03-19 RX ORDER — CODEINE PHOSPHATE AND GUAIFENESIN 10; 100 MG/5ML; MG/5ML
5 SOLUTION ORAL EVERY 6 HOURS PRN
Qty: 100 ML | Refills: 0 | Status: SHIPPED
Start: 2024-03-19 | End: 2024-03-19

## 2024-03-19 ASSESSMENT — PATIENT HEALTH QUESTIONNAIRE - PHQ9: CLINICAL INTERPRETATION OF PHQ2 SCORE: 0

## 2024-03-19 ASSESSMENT — ENCOUNTER SYMPTOMS
FEVER: 1
PALPITATIONS: 0
SORE THROAT: 1
CHILLS: 0

## 2024-03-19 NOTE — PROGRESS NOTES
Patient agreed to use of ZULY: yes  Chief Complaint   Patient presents with    Cough     2 WEEKS     Pharyngitis     4 DAYS        HISTORY OF PRESENT ILLNESS: Patient is a pleasant 43 y.o. female, established patient who presents today to discuss medical problems as listed below:    Assessment/Plan:    Razia was seen today for cough and pharyngitis.    Diagnoses and all orders for this visit:    Cough, unspecified type  -     Guaifenesin 1200 MG TABLET SR 12 HR; Take 1 Tablet by mouth 2 times a day.  -     guaifenesin-codeine (ROBITUSSIN AC) Solution oral solution; Take 5 mL by mouth every 6 hours as needed for Cough for up to 5 days.  -     methylPREDNISolone (MEDROL DOSEPAK) 4 MG Tablet Therapy Pack; As directed on the packaging label.    Low grade fever  -     azithromycin (ZITHROMAX) 250 MG Tab; Take 2 tabs on day 1, then take 1 tab on days 2-5    Sore throat    Chest congestion  -     Guaifenesin 1200 MG TABLET SR 12 HR; Take 1 Tablet by mouth 2 times a day.    Bronchitis  -     methylPREDNISolone (MEDROL DOSEPAK) 4 MG Tablet Therapy Pack; As directed on the packaging label.    Nasal congestion  -     fluticasone (FLONASE) 50 MCG/ACT nasal spray; Administer 1 Spray into affected nostril(S) at bedtime.              Assessment & Plan  1. Acute uncontrolled condition.  I think she has bronchitis. We are going to do Z-Bronson for the antibiotic, complete the course. She will avoid sugary drinks and foods. She will take probiotic after antibiotic course.  She is not interested in Tessalon Perles as it did not work for her in the past for cough.  She would like Fenesin with codeine.  Takes no other controlled substances.  She will take Flonase for the nasal drainage. She will take cough syrup with codeine for 5  days.. If she is not better in the next few days, she will come back.    History of Present Illness  This is a 43-year-old pleasant female who is here for URI concerns, sore throat and cough.    She is starting to  "become more nasally, a little bit of sinus congestion, and apparently has a low-grade fever of 100. The cough started about 2 weeks ago, the fever has been on and off for the last 4 or 5 days, and the nose symptoms have been going on for 3 or 4 days. She is not sure if the throat symptoms are from the cough or if it is something else. She denies any GI issues like nausea, ear pain, shortness of breath, wheezing, or chest pain from the coughing. She coughs up phlegm sometimes. Her children are sick at home, but none of them have tested positive for COVID-19. She has not done a home COVID-19 test. She has tried Tylenol Cold and Flu and Theraflu at nighttime, but nothing is helping. She feels like she is getting worse. The cough keeps her up all night. She denies any history of asthma. She denies any difficulty swallowing. She has taken azithromycin in the past and did well with it. She has mild nasal drainage.   The patient has no known allergies.  Pt declines swabs for strep throat, RSV, flu and COVID.    Health Maintenance:  COMPLETED       Allergies, past medical history, past surgical history, family history, social history reviewed and updated.    Review of Systems   Constitutional:  Positive for fever. Negative for chills and malaise/fatigue.        Low grade fever   HENT:  Positive for congestion and sore throat.    Cardiovascular:  Negative for chest pain, palpitations and leg swelling.          Exam:    /68 (BP Location: Left arm, Patient Position: Sitting, BP Cuff Size: Adult)   Pulse 87   Temp 37.9 °C (100.2 °F) (Temporal)   Resp 12   Ht 1.575 m (5' 2\")   Wt 55.5 kg (122 lb 6.4 oz)   SpO2 96%   BMI 22.39 kg/m²  Body mass index is 22.39 kg/m².      Physical Exam             Results             Discussed with patient possible alternative diagnoses, patient is to take all medications as prescribed.      If symptoms persist FU w/PCP, if symptoms worsen go to emergency room.      If experiencing any " side effects from prescribed medications report to the office immediately or go to emergency room.     Reviewed indication, dosage, usage and potential adverse effects of prescribed medications.      Reviewed risks and benefits of treatment plan. Patient verbalizes understanding of all instruction and verbally agrees to plan.     Discussed plan with the patient, and patient agrees to the above.      I personally reviewed prior external notes and test results pertinent to today's visit.

## 2024-03-28 ENCOUNTER — TELEPHONE (OUTPATIENT)
Dept: MEDICAL GROUP | Facility: MEDICAL CENTER | Age: 44
End: 2024-03-28

## 2024-03-28 ENCOUNTER — TELEMEDICINE (OUTPATIENT)
Dept: MEDICAL GROUP | Facility: MEDICAL CENTER | Age: 44
End: 2024-03-28
Payer: COMMERCIAL

## 2024-03-28 VITALS — WEIGHT: 122 LBS | HEIGHT: 62 IN | BODY MASS INDEX: 22.45 KG/M2

## 2024-03-28 DIAGNOSIS — R05.2 SUBACUTE COUGH: ICD-10-CM

## 2024-03-28 DIAGNOSIS — R06.2 WHEEZING: ICD-10-CM

## 2024-03-28 DIAGNOSIS — Z87.09 HISTORY OF ENLARGED TONSILS: ICD-10-CM

## 2024-03-28 RX ORDER — FLUTICASONE PROPIONATE AND SALMETEROL 250; 50 UG/1; UG/1
1 POWDER RESPIRATORY (INHALATION) EVERY 12 HOURS
Qty: 60 EACH | Refills: 1 | Status: SHIPPED | OUTPATIENT
Start: 2024-03-28

## 2024-03-28 ASSESSMENT — ENCOUNTER SYMPTOMS
WHEEZING: 1
PALPITATIONS: 0
COUGH: 1
CHILLS: 0
FEVER: 0

## 2024-03-28 NOTE — TELEPHONE ENCOUNTER
DOCUMENTATION OF PAR STATUS:    1. Name of Medication & Dose: Advair Diskus 250-50MCG/ACT aerosol powder     2. Name of Prescription Coverage Company & phone #: [email or web address]    3. Date Prior Auth Submitted: 3/28/24    4. What information was given to obtain insurance decision? Clinical office notes    5. Prior Auth Status? Pending    6. Patient Notified: N\A

## 2024-03-28 NOTE — PROGRESS NOTES
Virtual Visit: Established Patient   This visit was conducted via Zoom using secure and encrypted videoconferencing technology.   The patient was in their home in the [facility].    The patient's identity was confirmed and verbal consent was obtained for this virtual visit.   This evaluation was conducted via Zoom using secure and encrypted videoconferencing technology. The patient was in their home in the [facility].    The patient's identity was confirmed and verbal consent was obtained for this virtual visit.     Patient agreed to using ZULY: yes    Razia was seen today for cough and referral needed.    Diagnoses and all orders for this visit:    Subacute cough  -     PULMONARY FUNCTION TESTS -Test requested: Complete Pulmonary Function Test; Future  -     DX-CHEST-2 VIEWS; Future  -     Referral to ENT  -     fluticasone-salmeterol (ADVAIR) 250-50 MCG/ACT AEROSOL POWDER, BREATH ACTIVATED; Inhale 1 Puff every 12 hours.    Wheezing  -     PULMONARY FUNCTION TESTS -Test requested: Complete Pulmonary Function Test; Future  -     DX-CHEST-2 VIEWS; Future  -     Referral to ENT  -     fluticasone-salmeterol (ADVAIR) 250-50 MCG/ACT AEROSOL POWDER, BREATH ACTIVATED; Inhale 1 Puff every 12 hours.    History of enlarged tonsils  -     Referral to ENT              Assessment & Plan  1. Cough.  2. wheezing  Chronic and stable. Persistent cough improving. I will do a pulmonary function test and CXR. Trial of albuterol inhaler. She will use albuterol for 5 to 10 minutes before exercise as needed. F/u in 4 wks    3. Tonsil stones.  Chronic and stable problem. No exacerbations at this time.  I will refer her to ENT.    Follow-up  The patient will follow up in 4 to 6 weeks.    Subjective:   CC:   Chief Complaint   Patient presents with    Cough    Referral Needed     Tonsil removal        History of Present Illness  This is a pleasant 43-year-old female who is presenting via Zoom call. She is here to follow up on her  cough that she was diagnosed  on 03/19/2024. She was prescribed azithromycin, Medrol Dosepak, Flonase, and guaifenesin. She completed therapy and now she is experiencing wheezing.    Her cough has gotten better, but she is unsure if it is just the mucus has started to break up, which is causing more raspy cough. She is wondering if she needed another round of antibiotics. She is still waking up in the middle of the night coughing. She feels it has morphed into more of a looser bronchial cough rather than the dry cough that she was experiencing a couple of weeks ago. She has not been diagnosed with asthma. She was given some inhalers a long time ago, which seemed to clear up a lot. Albuterol inhaler helped. She has chest tightness and shortness of breath only if she is doing something active like walking up a hill.    She was recommended to have her tonsils removed a couple of years ago, but she never moved forward with that recommendation. She keeps getting tonsil stones. She would like a referral to ENT.       Review of Systems   Constitutional:  Negative for chills, fever and malaise/fatigue.   Respiratory:  Positive for cough and wheezing.    Cardiovascular:  Negative for chest pain, palpitations and leg swelling.        Current medicines (including changes today)  Current Outpatient Medications   Medication Sig Dispense Refill    fluticasone-salmeterol (ADVAIR) 250-50 MCG/ACT AEROSOL POWDER, BREATH ACTIVATED Inhale 1 Puff every 12 hours. 60 Each 1    fluticasone (FLONASE) 50 MCG/ACT nasal spray Administer 1 Spray into affected nostril(S) at bedtime. 16 g 1    azithromycin (ZITHROMAX) 250 MG Tab Take 2 tabs on day 1, then take 1 tab on days 2-5 6 Tablet 0    methylPREDNISolone (MEDROL DOSEPAK) 4 MG Tablet Therapy Pack As directed on the packaging label. 21 Tablet 0    azithromycin (ZITHROMAX) 250 MG Tab Take 2 tabs on day 1, then take 1 tab on days 2-5 6 Tablet 0    triamcinolone (NASACORT) 55 MCG/ACT nasal  "inhaler Administer 2 Sprays into affected nostril(S) every day. 16.9 mL 1    guaifenesin dextromethorphan sugar free (ROBITUSSIN COLD COUGH+ CHEST)  MG/5ML Liquid soln Take 10 mL by mouth every four hours as needed for Cough. 236 mL 0    benzonatate (TESSALON) 100 MG Cap Take 1 Capsule by mouth 3 times a day as needed for Cough. 60 Capsule 0    benzonatate (TESSALON) 100 MG Cap Take 1 Capsule by mouth 3 times a day as needed for Cough. 60 Capsule 0    benzonatate (TESSALON) 100 MG Cap Take 1 Capsule by mouth 3 times a day as needed for Cough. 60 Capsule 0    albuterol 108 (90 Base) MCG/ACT Aero Soln inhalation aerosol Inhale 2 Puffs every 6 hours as needed for Shortness of Breath. 8.5 g 1     No current facility-administered medications for this visit.        Objective:   Ht 1.575 m (5' 2\") Comment: pt stated  Wt 55.3 kg (122 lb) Comment: pt stated  BMI 22.31 kg/m²     Physical Exam:  Constitutional: Alert, no distress, well-groomed.  Skin: No rashes in visible areas.  Eye: Round. Conjunctiva clear, lids normal. No icterus.   ENMT: Lips pink without lesions, good dentition, moist mucous membranes. Phonation normal.  Neck: No masses, no thyromegaly. Moves freely without pain.  Respiratory: Unlabored respiratory effort, no cough or audible wheeze  Psych: Alert and oriented x3, normal affect and mood.     Results         Discussed with patient possible alternative diagnoses, patient is to take all medications as prescribed.      If symptoms persist FU w/PCP, if symptoms worsen go to emergency room.      If experiencing any side effects from prescribed medications report to the office immediately or go to emergency room.     Reviewed indication, dosage, usage and potential adverse effects of prescribed medications.      Reviewed risks and benefits of treatment plan. Patient verbalizes understanding of all instruction and verbally agrees to plan.     Discussed plan with the patient, and patient agrees to the " above.      I personally reviewed prior external notes and test results pertinent to today's visit.     No follow-ups on file. 3-4 wks

## 2024-03-29 RX ORDER — METHYLPREDNISOLONE 4 MG/1
TABLET ORAL
Qty: 21 TABLET | Refills: 0 | Status: SHIPPED | OUTPATIENT
Start: 2024-03-29

## 2024-12-30 ENCOUNTER — OFFICE VISIT (OUTPATIENT)
Dept: MEDICAL GROUP | Facility: PHYSICIAN GROUP | Age: 44
End: 2024-12-30
Payer: COMMERCIAL

## 2024-12-30 VITALS
BODY MASS INDEX: 21.71 KG/M2 | WEIGHT: 118 LBS | DIASTOLIC BLOOD PRESSURE: 64 MMHG | SYSTOLIC BLOOD PRESSURE: 102 MMHG | OXYGEN SATURATION: 98 % | HEART RATE: 70 BPM | TEMPERATURE: 97.5 F | HEIGHT: 62 IN

## 2024-12-30 DIAGNOSIS — R06.2 WHEEZING: ICD-10-CM

## 2024-12-30 DIAGNOSIS — J01.10 ACUTE NON-RECURRENT FRONTAL SINUSITIS: ICD-10-CM

## 2024-12-30 RX ORDER — ALBUTEROL SULFATE 90 UG/1
2 INHALANT RESPIRATORY (INHALATION) EVERY 4 HOURS PRN
Qty: 8.5 G | Refills: 0 | Status: SHIPPED | OUTPATIENT
Start: 2024-12-30

## 2024-12-30 NOTE — PROGRESS NOTES
Chief Complaint   Patient presents with    Cough     For about 3 weeks. Congestion. Sinus pressure. Fatigue         HPI: Patient is a 44 y.o. female complaining of 3 weeks of illness including: chills, nonproductive cough, ear pain, facial pain, fever, nasal congestion, sore throat, wheezing.   Mucus is: green.  Similarly ill exposures: no.  Treatments tried: tylenol cold and flu, nyquil, mucinex  She  reports that she has never smoked. She has never used smokeless tobacco..       States sinus infections x 2/year states that she will be sick for weeks at a time. Has seen ENT.     ROS:  Positive fever, cough, no nausea, changes in bowel movements or skin rash.      I reviewed the patient's medications, allergies and medical history:  Current Outpatient Medications   Medication Sig Dispense Refill    amoxicillin-clavulanate (AUGMENTIN) 875-125 MG Tab Take 1 Tablet by mouth 2 times a day for 10 days. 20 Tablet 0    albuterol (PROAIR HFA) 108 (90 Base) MCG/ACT Aero Soln inhalation aerosol Inhale 2 Puffs every four hours as needed for Shortness of Breath. 8.5 g 0    methylPREDNISolone (MEDROL DOSEPAK) 4 MG Tablet Therapy Pack As directed on the packaging label. 21 Tablet 0    fluticasone-salmeterol (ADVAIR) 250-50 MCG/ACT AEROSOL POWDER, BREATH ACTIVATED Inhale 1 Puff every 12 hours. 60 Each 1    fluticasone (FLONASE) 50 MCG/ACT nasal spray Administer 1 Spray into affected nostril(S) at bedtime. 16 g 1    methylPREDNISolone (MEDROL DOSEPAK) 4 MG Tablet Therapy Pack As directed on the packaging label. 21 Tablet 0    triamcinolone (NASACORT) 55 MCG/ACT nasal inhaler Administer 2 Sprays into affected nostril(S) every day. 16.9 mL 1    guaifenesin dextromethorphan sugar free (ROBITUSSIN COLD COUGH+ CHEST)  MG/5ML Liquid soln Take 10 mL by mouth every four hours as needed for Cough. 236 mL 0    benzonatate (TESSALON) 100 MG Cap Take 1 Capsule by mouth 3 times a day as needed for Cough. 60 Capsule 0    benzonatate  "(TESSALON) 100 MG Cap Take 1 Capsule by mouth 3 times a day as needed for Cough. 60 Capsule 0    benzonatate (TESSALON) 100 MG Cap Take 1 Capsule by mouth 3 times a day as needed for Cough. 60 Capsule 0    albuterol 108 (90 Base) MCG/ACT Aero Soln inhalation aerosol Inhale 2 Puffs every 6 hours as needed for Shortness of Breath. 8.5 g 1     No current facility-administered medications for this visit.     Doxycycline  Past Medical History:   Diagnosis Date    Allergy     seasonal    Anxiety     Headache(784.0)         EXAM:  /64 (BP Location: Left arm, Patient Position: Sitting, BP Cuff Size: Adult)   Pulse 70   Temp 36.4 °C (97.5 °F) (Temporal)   Ht 1.575 m (5' 2\")   Wt 53.5 kg (118 lb)   SpO2 98%   General: Alert, no conversational dyspnea or audible wheeze, non-toxic appearance.  Eyes: PERRL, conjunctiva slightly injected, no eye discharge.  Ears: Normal pinnae,TM's bulging bilaterally.  Nares: Patent with thick mucus.  Sinuses: tender over maxillary / frontal sinuses.  Throat: Erythematous injection without exudate.   Neck: Supple, with mildly enlarged cervical nodes.  Lungs: Clear to auscultation bilaterally, no wheeze, crackles or rhonchi.   Heart: Regular rate without murmur.  Skin: Warm and dry without rash.     ASSESSMENT:   1. Wheezing    2. Acute non-recurrent frontal sinusitis          PLAN:  1. As symptoms have been worsening over the last week, will treat with antibiotics.  Augmentin 8 7 5-125 mg p.o. twice daily for 10 days  Inhaler prescribed due to wheezing  2. Twice daily use of nasal saline rinse or Neti-Pot.  3. OTC anti-pyretics and decongestants as needed.  4. Follow-up in office or urgent care for worsening symptoms, difficulty breathing, lack of expected recovery, or should new symptoms or problems arise.    "

## 2025-03-10 ENCOUNTER — OFFICE VISIT (OUTPATIENT)
Dept: MEDICAL GROUP | Facility: PHYSICIAN GROUP | Age: 45
End: 2025-03-10
Payer: COMMERCIAL

## 2025-03-10 VITALS
TEMPERATURE: 97.6 F | HEART RATE: 88 BPM | OXYGEN SATURATION: 98 % | BODY MASS INDEX: 21.16 KG/M2 | SYSTOLIC BLOOD PRESSURE: 110 MMHG | HEIGHT: 62 IN | DIASTOLIC BLOOD PRESSURE: 78 MMHG | WEIGHT: 115 LBS

## 2025-03-10 DIAGNOSIS — J01.01 ACUTE RECURRENT MAXILLARY SINUSITIS: ICD-10-CM

## 2025-03-10 PROCEDURE — 3078F DIAST BP <80 MM HG: CPT | Performed by: NURSE PRACTITIONER

## 2025-03-10 PROCEDURE — 99213 OFFICE O/P EST LOW 20 MIN: CPT | Performed by: NURSE PRACTITIONER

## 2025-03-10 PROCEDURE — 3074F SYST BP LT 130 MM HG: CPT | Performed by: NURSE PRACTITIONER

## 2025-03-10 RX ORDER — METHYLPREDNISOLONE 4 MG/1
TABLET ORAL
Qty: 21 TABLET | Refills: 0 | Status: SHIPPED | OUTPATIENT
Start: 2025-03-10

## 2025-03-10 ASSESSMENT — PATIENT HEALTH QUESTIONNAIRE - PHQ9: CLINICAL INTERPRETATION OF PHQ2 SCORE: 0

## 2025-03-10 NOTE — PROGRESS NOTES
"Chief Complaint   Patient presents with    Cough     X 1 month    Congestion     X 1 month        HPI: Patient is a 44 y.o. female complaining of 4 weeks of illness including: chills, productive cough, shortness of breath, nasal congestion, wheezing.   Mucus is: thick.  Similarly ill exposures: no.  Treatments tried: tylenol cold and flu  She  reports that she has never smoked. She has never used smokeless tobacco..     ROS:  No fever, + cough, no nausea, changes in bowel movements or skin rash.      I reviewed the patient's medications, allergies and medical history:  Current Outpatient Medications   Medication Sig Dispense Refill    amoxicillin-clavulanate (AUGMENTIN) 875-125 MG Tab Take 1 Tablet by mouth 2 times a day for 7 days. 14 Tablet 0    methylPREDNISolone (MEDROL DOSEPAK) 4 MG Tablet Therapy Pack Per  directions on package 21 Tablet 0    albuterol (PROAIR HFA) 108 (90 Base) MCG/ACT Aero Soln inhalation aerosol Inhale 2 Puffs every four hours as needed for Shortness of Breath. 8.5 g 0     No current facility-administered medications for this visit.     Doxycycline  Past Medical History:   Diagnosis Date    Allergy     seasonal    Anxiety     Headache(784.0)         EXAM:  /78 (BP Location: Left arm, Patient Position: Sitting, BP Cuff Size: Adult)   Pulse 88   Temp 36.4 °C (97.6 °F) (Temporal)   Ht 1.575 m (5' 2\")   Wt 52.2 kg (115 lb)   SpO2 98%   General: Alert, no conversational dyspnea or audible wheeze, non-toxic appearance.  Eyes: PERRL, conjunctiva slightly injected, no eye discharge.  Ears: Normal pinnae,TM's bulging bilaterally.  Nares: Patent with thick mucus.  Sinuses: tender over maxillary / frontal sinuses.  Throat: Erythematous injection without exudate.   Neck: Supple, with moderately enlarged cervical nodes.  Lungs: Clear to auscultation bilaterally, no wheeze, crackles or rhonchi.   Heart: Regular rate without murmur.  Skin: Warm and dry without rash.     ASSESSMENT: "   1. Acute recurrent maxillary sinusitis          PLAN:  1.  As symptoms have been worsening over the last week, will treat with antibiotics.  Start Augmentin 8 7 5-125 mg by mouth twice daily and Medrol dose pack per instructions  2. Twice daily use of nasal saline rinse or Neti-Pot.  3. OTC anti-pyretics and decongestants as needed.  4. Follow-up in office or urgent care for worsening symptoms, difficulty breathing, lack of expected recovery, or should new symptoms or problems arise.

## 2025-03-11 ENCOUNTER — APPOINTMENT (OUTPATIENT)
Dept: MEDICAL GROUP | Facility: PHYSICIAN GROUP | Age: 45
End: 2025-03-11
Payer: COMMERCIAL

## 2025-04-09 SDOH — ECONOMIC STABILITY: FOOD INSECURITY: WITHIN THE PAST 12 MONTHS, THE FOOD YOU BOUGHT JUST DIDN'T LAST AND YOU DIDN'T HAVE MONEY TO GET MORE.: NEVER TRUE

## 2025-04-09 SDOH — ECONOMIC STABILITY: TRANSPORTATION INSECURITY
IN THE PAST 12 MONTHS, HAS THE LACK OF TRANSPORTATION KEPT YOU FROM MEDICAL APPOINTMENTS OR FROM GETTING MEDICATIONS?: NO

## 2025-04-09 SDOH — HEALTH STABILITY: PHYSICAL HEALTH: ON AVERAGE, HOW MANY DAYS PER WEEK DO YOU ENGAGE IN MODERATE TO STRENUOUS EXERCISE (LIKE A BRISK WALK)?: 3 DAYS

## 2025-04-09 SDOH — ECONOMIC STABILITY: FOOD INSECURITY: WITHIN THE PAST 12 MONTHS, YOU WORRIED THAT YOUR FOOD WOULD RUN OUT BEFORE YOU GOT MONEY TO BUY MORE.: NEVER TRUE

## 2025-04-09 SDOH — HEALTH STABILITY: PHYSICAL HEALTH: ON AVERAGE, HOW MANY MINUTES DO YOU ENGAGE IN EXERCISE AT THIS LEVEL?: 60 MIN

## 2025-04-09 SDOH — ECONOMIC STABILITY: TRANSPORTATION INSECURITY
IN THE PAST 12 MONTHS, HAS LACK OF TRANSPORTATION KEPT YOU FROM MEETINGS, WORK, OR FROM GETTING THINGS NEEDED FOR DAILY LIVING?: NO

## 2025-04-09 SDOH — HEALTH STABILITY: MENTAL HEALTH
STRESS IS WHEN SOMEONE FEELS TENSE, NERVOUS, ANXIOUS, OR CAN'T SLEEP AT NIGHT BECAUSE THEIR MIND IS TROUBLED. HOW STRESSED ARE YOU?: TO SOME EXTENT

## 2025-04-09 SDOH — ECONOMIC STABILITY: INCOME INSECURITY: IN THE LAST 12 MONTHS, WAS THERE A TIME WHEN YOU WERE NOT ABLE TO PAY THE MORTGAGE OR RENT ON TIME?: NO

## 2025-04-09 SDOH — ECONOMIC STABILITY: INCOME INSECURITY: HOW HARD IS IT FOR YOU TO PAY FOR THE VERY BASICS LIKE FOOD, HOUSING, MEDICAL CARE, AND HEATING?: NOT VERY HARD

## 2025-04-09 SDOH — ECONOMIC STABILITY: TRANSPORTATION INSECURITY
IN THE PAST 12 MONTHS, HAS LACK OF RELIABLE TRANSPORTATION KEPT YOU FROM MEDICAL APPOINTMENTS, MEETINGS, WORK OR FROM GETTING THINGS NEEDED FOR DAILY LIVING?: NO

## 2025-04-09 SDOH — ECONOMIC STABILITY: HOUSING INSECURITY
IN THE LAST 12 MONTHS, WAS THERE A TIME WHEN YOU DID NOT HAVE A STEADY PLACE TO SLEEP OR SLEPT IN A SHELTER (INCLUDING NOW)?: NO

## 2025-04-09 ASSESSMENT — LIFESTYLE VARIABLES
SKIP TO QUESTIONS 9-10: 1
HOW OFTEN DO YOU HAVE A DRINK CONTAINING ALCOHOL: 2-4 TIMES A MONTH
HOW OFTEN DO YOU HAVE SIX OR MORE DRINKS ON ONE OCCASION: NEVER
HOW MANY STANDARD DRINKS CONTAINING ALCOHOL DO YOU HAVE ON A TYPICAL DAY: 1 OR 2
AUDIT-C TOTAL SCORE: 2

## 2025-04-09 ASSESSMENT — SOCIAL DETERMINANTS OF HEALTH (SDOH)
WITHIN THE PAST 12 MONTHS, YOU WORRIED THAT YOUR FOOD WOULD RUN OUT BEFORE YOU GOT THE MONEY TO BUY MORE: NEVER TRUE
HOW HARD IS IT FOR YOU TO PAY FOR THE VERY BASICS LIKE FOOD, HOUSING, MEDICAL CARE, AND HEATING?: NOT VERY HARD
IN A TYPICAL WEEK, HOW MANY TIMES DO YOU TALK ON THE PHONE WITH FAMILY, FRIENDS, OR NEIGHBORS?: MORE THAN THREE TIMES A WEEK
HOW OFTEN DO YOU HAVE A DRINK CONTAINING ALCOHOL: 2-4 TIMES A MONTH
IN A TYPICAL WEEK, HOW MANY TIMES DO YOU TALK ON THE PHONE WITH FAMILY, FRIENDS, OR NEIGHBORS?: MORE THAN THREE TIMES A WEEK
DO YOU BELONG TO ANY CLUBS OR ORGANIZATIONS SUCH AS CHURCH GROUPS UNIONS, FRATERNAL OR ATHLETIC GROUPS, OR SCHOOL GROUPS?: NO
HOW OFTEN DO YOU ATTENT MEETINGS OF THE CLUB OR ORGANIZATION YOU BELONG TO?: PATIENT DECLINED
HOW OFTEN DO YOU GET TOGETHER WITH FRIENDS OR RELATIVES?: TWICE A WEEK
IN THE PAST 12 MONTHS, HAS THE ELECTRIC, GAS, OIL, OR WATER COMPANY THREATENED TO SHUT OFF SERVICE IN YOUR HOME?: NO
HOW OFTEN DO YOU ATTENT MEETINGS OF THE CLUB OR ORGANIZATION YOU BELONG TO?: PATIENT DECLINED
HOW OFTEN DO YOU GET TOGETHER WITH FRIENDS OR RELATIVES?: TWICE A WEEK
HOW MANY DRINKS CONTAINING ALCOHOL DO YOU HAVE ON A TYPICAL DAY WHEN YOU ARE DRINKING: 1 OR 2
DO YOU BELONG TO ANY CLUBS OR ORGANIZATIONS SUCH AS CHURCH GROUPS UNIONS, FRATERNAL OR ATHLETIC GROUPS, OR SCHOOL GROUPS?: NO
HOW OFTEN DO YOU ATTEND CHURCH OR RELIGIOUS SERVICES?: PATIENT DECLINED
HOW OFTEN DO YOU HAVE SIX OR MORE DRINKS ON ONE OCCASION: NEVER
HOW OFTEN DO YOU ATTEND CHURCH OR RELIGIOUS SERVICES?: PATIENT DECLINED

## 2025-04-10 ENCOUNTER — OFFICE VISIT (OUTPATIENT)
Dept: MEDICAL GROUP | Facility: PHYSICIAN GROUP | Age: 45
End: 2025-04-10
Payer: COMMERCIAL

## 2025-04-10 VITALS
OXYGEN SATURATION: 97 % | TEMPERATURE: 98.1 F | BODY MASS INDEX: 21.31 KG/M2 | SYSTOLIC BLOOD PRESSURE: 114 MMHG | HEIGHT: 62 IN | WEIGHT: 115.8 LBS | HEART RATE: 81 BPM | DIASTOLIC BLOOD PRESSURE: 62 MMHG

## 2025-04-10 DIAGNOSIS — R41.840 CONCENTRATION DEFICIT: ICD-10-CM

## 2025-04-10 DIAGNOSIS — Z12.31 ENCOUNTER FOR SCREENING MAMMOGRAM FOR MALIGNANT NEOPLASM OF BREAST: ICD-10-CM

## 2025-04-10 DIAGNOSIS — Z91.09 ENVIRONMENTAL ALLERGIES: ICD-10-CM

## 2025-04-10 PROBLEM — R05.9 COUGH: Status: RESOLVED | Noted: 2022-05-05 | Resolved: 2025-04-10

## 2025-04-10 PROBLEM — Z00.00 ANNUAL PHYSICAL EXAM: Status: RESOLVED | Noted: 2019-05-16 | Resolved: 2025-04-10

## 2025-04-10 PROBLEM — J32.9 RHINOSINUSITIS: Status: RESOLVED | Noted: 2019-11-07 | Resolved: 2025-04-10

## 2025-04-10 PROBLEM — R68.89 FLU-LIKE SYMPTOMS: Status: RESOLVED | Noted: 2022-05-05 | Resolved: 2025-04-10

## 2025-04-10 PROCEDURE — 3078F DIAST BP <80 MM HG: CPT | Performed by: NURSE PRACTITIONER

## 2025-04-10 PROCEDURE — 99214 OFFICE O/P EST MOD 30 MIN: CPT | Performed by: NURSE PRACTITIONER

## 2025-04-10 PROCEDURE — 3074F SYST BP LT 130 MM HG: CPT | Performed by: NURSE PRACTITIONER

## 2025-04-10 NOTE — PROGRESS NOTES
Verbal consent was acquired by the patient to use [employer] ambient listening note generation during this visit yes    Subjective:     HPI:   History of Present Illness  The patient presents for an annual exam follow-up.    Sinus Condition  She reports a significant improvement in her sinus condition, attributing the residual clear discharge to allergies. She has discontinued the use of albuterol.  - Character: Residual clear discharge.  - Alleviating/Aggravating Factors: Attributed to allergies; discontinued use of albuterol.  - Severity: Significant improvement.    Allergies are chronic in nature and stable.  Patient will continue to monitor these and contact this provider if further intervention is needed    Attention Span Concerns  She expresses concern about her attention span, suspecting undiagnosed ADD throughout her life. This issue arises intermittently every few months, affecting her concentration and ability to complete tasks. However, she manages to function effectively most of the time, and it does not impact her work performance. She is considering a full evaluation for this issue.  - Onset: Throughout her life, arising intermittently every few months.  - Duration: Intermittent.  - Character: Affects concentration and ability to complete tasks.  - Severity: Does not impact work performance; manages to function effectively most of the time.    Routine Health Screenings  She has initiated mammograms and undergoes Pap smears with her gynecologist. She recalls receiving the hepatitis B vaccine series during her employment at [employer]. She is uncertain about her HIV and hepatitis C screening status. She declines the offer for screening labs at this time. She has a history of chickenpox in her childhood.    SOCIAL HISTORY  She has 2 children. She does not smoke. She drinks alcohol occasionally. She does not use marijuana or other drugs.    FAMILY HISTORY  Her mother had breast cancer diagnosed at age 50,  "which was not genetic. Her father had high blood pressure and high cholesterol. Her maternal grandfather had throat cancer and was a smoker. An aunt and an uncle on her father's side had strokes.    ALLERGIES  She is allergic to DOXYCYCLINE.    MEDICATIONS  - Discontinued: albuterol    IMMUNIZATIONS  She has received the hepatitis B vaccine series in the past.    Health Maintenance: [facility] labs, HIV and hepatitis C screening at this time    Objective:     Exam:  /62 (BP Location: Left arm, Patient Position: Sitting, BP Cuff Size: Adult long)   Pulse 81   Temp 36.7 °C (98.1 °F) (Temporal)   Ht 1.575 m (5' 2\")   Wt 52.5 kg (115 lb 12.8 oz)   LMP 03/24/2025   SpO2 97%   BMI 21.18 kg/m²  Body mass index is 21.18 kg/m².    Physical Exam  Constitutional:       Appearance: Normal appearance.   HENT:      Head: Normocephalic and atraumatic.   Cardiovascular:      Rate and Rhythm: Normal rate and regular rhythm.      Pulses: Normal pulses.      Heart sounds: Normal heart sounds.   Pulmonary:      Effort: Pulmonary effort is normal.      Breath sounds: Normal breath sounds.   Skin:     General: Skin is warm and dry.      Capillary Refill: Capillary refill takes less than 2 seconds.   Neurological:      General: No focal deficit present.      Mental Status: She is alert and oriented to person, place, and time.           Results  Laboratory Studies  A1c was 5.8 last time. Urinalysis showed positive nitrites.    Assessment & Plan:     1. Environmental allergies        2. Concentration deficit        3. Encounter for screening mammogram for malignant neoplasm of breast  MA-SCREENING MAMMO BILAT W/TOMOSYNTHESIS W/CAD          Assessment & Plan  1. Sinusitis: Resolved.  - Continue monitoring symptoms.  - Use albuterol as needed.    2. Attention deficit disorder (ADD).  - Consider comprehensive evaluation for ADD.  - Discuss potential benefits of therapy and medication.  - Inform if decision to proceed with " evaluation.    3. Health maintenance.  - Annual examination due.  - Pap smear due in 2026.  - Personal history of chickenpox.  - Hepatitis B vaccine series completed.  - A1c 5.8 during previous visit.  - Undergo yearly mammograms.  - Consider consultation with [facility] at [facility].  - Consider shingles vaccine at age 51.  - HIV and hepatitis C screenings suggested at least once.  - Tetanus shot recommended every 10 years.  - Mammogram order placed today.          I spent a total of 30 minutes with record review, exam, communication with the patient, communication with other providers, and documentation of this encounter.    Return in about 1 year (around 4/10/2026), or if symptoms worsen or fail to improve.    I have placed the below orders and discussed them with an approved delegating provider. The MA is performing the below orders under the direction of Dr. Hunter.      Please note that this dictation was created using voice recognition software. I have made every reasonable attempt to correct obvious errors, but I expect that there are errors of grammar and possibly content that I did not discover before finalizing the note.